# Patient Record
Sex: FEMALE | Race: WHITE
[De-identification: names, ages, dates, MRNs, and addresses within clinical notes are randomized per-mention and may not be internally consistent; named-entity substitution may affect disease eponyms.]

---

## 2020-07-28 ENCOUNTER — HOSPITAL ENCOUNTER (OUTPATIENT)
Dept: HOSPITAL 92 - SCSULT | Age: 25
Discharge: HOME | End: 2020-07-28
Attending: INTERNAL MEDICINE
Payer: COMMERCIAL

## 2020-07-28 DIAGNOSIS — R10.11: Primary | ICD-10-CM

## 2020-07-28 DIAGNOSIS — R10.13: ICD-10-CM

## 2020-07-28 PROCEDURE — 76705 ECHO EXAM OF ABDOMEN: CPT

## 2020-07-28 NOTE — ULT
Exam:

Right upper quadrant ultrasound:



HISTORY:

Epigastric abdominal pain.



COMPARISON:

None



FINDINGS:

Liver: Within normal limits

Gallbladder: No evidence of gallbladder calculi, gallbladder wall thickening, or pericholecystic flui
d.



Common bile duct: The common duct is normal in caliber  measuring 0.5 cm in diameter.

Pancreas: Limited visualized portions of the pancreas demonstrate a normal sonographic appearance.



Right kidney: Right kidney demonstrates a normal sonographic appearance.  The right kidney measures 1
0.5 cm in length.

IVC: The visualized IVC demonstrates a normal sonographic appearance.





IMPRESSION:

Right upper quadrant ultrasound is within normal limits; no gallbladder calculi are seen, and the com
mon duct is normal in caliber.



Reported By: Moris Pa 

Electronically Signed:  7/28/2020 9:27 AM

## 2020-10-09 ENCOUNTER — HOSPITAL ENCOUNTER (OUTPATIENT)
Dept: HOSPITAL 92 - DTY/OP | Age: 25
Discharge: HOME | End: 2020-10-09
Attending: FAMILY MEDICINE
Payer: COMMERCIAL

## 2020-10-09 DIAGNOSIS — E66.9: Primary | ICD-10-CM

## 2020-10-09 PROCEDURE — 97802 MEDICAL NUTRITION INDIV IN: CPT
